# Patient Record
Sex: FEMALE | Race: AMERICAN INDIAN OR ALASKA NATIVE | NOT HISPANIC OR LATINO | Employment: UNEMPLOYED | URBAN - METROPOLITAN AREA
[De-identification: names, ages, dates, MRNs, and addresses within clinical notes are randomized per-mention and may not be internally consistent; named-entity substitution may affect disease eponyms.]

---
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
## 2023-07-11 RX ORDER — LEVONORGESTREL AND ETHINYL ESTRADIOL 0.1-0.02MG
1 KIT ORAL
Qty: 28 TABLET | Refills: 0 | Status: SHIPPED | OUTPATIENT
Start: 2023-07-11 | End: 2023-08-08 | Stop reason: SDUPTHER

## 2023-07-11 NOTE — TELEPHONE ENCOUNTER
Mom called for a refill for her daughters birth control. I stated I will submit to a refill request for 1 month and pt needs to schedule an annual annual was scheduled.

## 2023-08-08 ENCOUNTER — TELEPHONE (OUTPATIENT)
Dept: OBGYN | Facility: CLINIC | Age: 18
End: 2023-08-08
Payer: MEDICAID

## 2023-08-08 DIAGNOSIS — Z30.41 ENCOUNTER FOR SURVEILLANCE OF CONTRACEPTIVE PILLS: ICD-10-CM

## 2023-08-08 RX ORDER — LEVONORGESTREL AND ETHINYL ESTRADIOL 0.1-0.02MG
1 KIT ORAL
Qty: 28 TABLET | Refills: 0 | Status: SHIPPED | OUTPATIENT
Start: 2023-08-08 | End: 2023-08-22 | Stop reason: SDUPTHER

## 2023-08-08 NOTE — TELEPHONE ENCOUNTER
Pt called triage line stating she will be out of her Birth control pills tomorrow needs a refill called pt to notify her that per consult with Alisha Anaya P.A-C we are able to refill 1 month because we have not seen her since 2020 urge to keep annual appt on 8/22/2023 for additional refills unable to contact pt left message to call back.

## 2023-08-22 ENCOUNTER — APPOINTMENT (OUTPATIENT)
Dept: OBGYN | Facility: CLINIC | Age: 18
End: 2023-08-22
Payer: MEDICAID

## 2023-08-22 DIAGNOSIS — Z30.41 ENCOUNTER FOR SURVEILLANCE OF CONTRACEPTIVE PILLS: ICD-10-CM

## 2023-08-22 RX ORDER — LEVONORGESTREL AND ETHINYL ESTRADIOL 0.1-0.02MG
1 KIT ORAL
Qty: 28 TABLET | Refills: 0 | Status: CANCELLED | OUTPATIENT
Start: 2023-08-22

## 2023-08-22 NOTE — TELEPHONE ENCOUNTER
Returned pt's. Call. Pt stated she has an appointment today with Balaji Estrada for an annual/BC refill but would like to cancel today's appointment. Pt stated she prefers to see Dr. Abreu but it's very hard to get an appointment with him. Pt stated she needs a refill for her BC pills and was wondering if she can get a courtesy refill for a month or two while waiting for her appoitment to see Dr. Abreu. (I spoke with Raine Carpio who stated that the next available appointment with Dr. Abreu is not until 09/08/23) pt was informed and agreed to come that day 09/08/23.  A message will be sent to Dr. Abreu for BC refill request.

## 2023-08-24 RX ORDER — LEVONORGESTREL AND ETHINYL ESTRADIOL 0.1-0.02MG
1 KIT ORAL
Qty: 28 TABLET | Refills: 0 | Status: SHIPPED | OUTPATIENT
Start: 2023-08-24

## 2023-09-08 ENCOUNTER — GYNECOLOGY VISIT (OUTPATIENT)
Dept: OBGYN | Facility: CLINIC | Age: 18
End: 2023-09-08
Payer: MEDICAID

## 2023-09-08 VITALS — SYSTOLIC BLOOD PRESSURE: 139 MMHG | DIASTOLIC BLOOD PRESSURE: 62 MMHG | WEIGHT: 293 LBS

## 2023-09-08 DIAGNOSIS — R63.5 WEIGHT GAIN: ICD-10-CM

## 2023-09-08 DIAGNOSIS — N94.4 PRIMARY DYSMENORRHEA: ICD-10-CM

## 2023-09-08 PROCEDURE — 3078F DIAST BP <80 MM HG: CPT | Performed by: OBSTETRICS & GYNECOLOGY

## 2023-09-08 PROCEDURE — 99203 OFFICE O/P NEW LOW 30 MIN: CPT | Performed by: OBSTETRICS & GYNECOLOGY

## 2023-09-08 PROCEDURE — 3075F SYST BP GE 130 - 139MM HG: CPT | Performed by: OBSTETRICS & GYNECOLOGY

## 2023-09-08 RX ORDER — DESOGESTREL AND ETHINYL ESTRADIOL 0.15-0.03
1 KIT ORAL DAILY
Qty: 84 TABLET | Refills: 3 | Status: SHIPPED | OUTPATIENT
Start: 2023-09-08

## 2023-09-08 ASSESSMENT — FIBROSIS 4 INDEX: FIB4 SCORE: 0.19

## 2023-09-08 NOTE — PROGRESS NOTES
Chief complaint;    Jocelyn Ley is a 18 y.o.  who presents complaining of weight gain.  Patient also has a primary dysmenorrhea.  She states she lost her father  due to heart attack  Not currently sexually active  She is currently on  OCP due to primary dysmenorrhea      Review of systems; denies fever chills abdominal pain, denies chest pain shortness of breath or urinary symptoms  Past medical history-  Past Medical History:   Diagnosis Date    Bronchitis     Cold 11    Cyst, ovarian 2017    Possible abscess    Other specified symptom associated with female genital organs     Labial adhesions - surgically removed    Sinusitis     Urinary tract infection      Past surgical history-  Past Surgical History:   Procedure Laterality Date    PELVIC EXAM UNDER ANESTHESIA  2013    Performed by Pascual Mota M.D. at [facility]    LYSIS ADHESIONS GYN  2013    Performed by Pascual Mota M.D. at [facility]    OTHER  2010    adenoidectomy    ADENOIDECTOMY      ADENOIDECTOMY       Allergies-Patient has no known allergies.  Medications-  Current Outpatient Medications on File Prior to Visit   Medication Sig Dispense Refill    levonorgestrel-ethinyl estradiol (SRONYX) 0.1-20 MG-MCG per tablet Take 1 Tablet by mouth every day. 28 Tablet 0    ibuprofen (MOTRIN) 200 MG Tab Take 400 mg by mouth every 6 hours as needed.      Levothyroxine Sodium 75 MCG Cap Take  by mouth.      metronidazole (FLAGYL) 500 MG Tab Take 1 Tab by mouth every 12 hours. (Patient not taking: Reported on 2020) 14 Tab 0     No current facility-administered medications on file prior to visit.     Social history-  Social History     Socioeconomic History    Marital status: Single     Spouse name: Not on file    Number of children: Not on file    Years of education: Not on file    Highest education level: Not on file   Occupational History    Not on file   Tobacco Use     Smoking status: Never    Smokeless tobacco: Never   Substance and Sexual Activity    Alcohol use: No    Drug use: No    Sexual activity: Never     Birth control/protection: OCP     Comment: OCP's for irregular cycles   Other Topics Concern    Behavioral problems Not Asked    Interpersonal relationships Not Asked    Sad or not enjoying activities Not Asked    Suicidal thoughts Not Asked    Poor school performance Not Asked    Reading difficulties Not Asked    Speech difficulties Not Asked    Writing difficulties Not Asked    Inadequate sleep Not Asked    Excessive TV viewing Not Asked    Excessive video game use Not Asked    Inadequate exercise Not Asked    Sports related Not Asked    Poor diet Not Asked    Second-hand smoke exposure Not Asked    Family concerns for drug/alcohol abuse Not Asked    Violence concerns Not Asked    Poor oral hygiene Not Asked    Bike safety Not Asked    Family concerns vehicle safety Not Asked   Social History Narrative    Not on file     Social Determinants of Health     Financial Resource Strain: Not on file   Food Insecurity: Not on file   Transportation Needs: Not on file   Physical Activity: Not on file   Stress: Not on file   Social Connections: Not on file   Intimate Partner Violence: Not on file   Housing Stability: Not on file     Past Family History-no history of breast or ovarian cancer    Physical examination;  Alert and oriented x3  General a thin well-developed well-nourished female in no apparent distress  Vitals:    09/08/23 0902   BP: 139/62   BP Location: Right arm   Patient Position: Sitting   BP Cuff Size: Small adult   Weight: (!) 386 lb     Skin is warm and dry  Neck-supple  HEENT-head-atraumatic, normocephalic, EOMI, PERRLA  Cardiovascular-regular rate and rhythm, normal S1-S2, no murmurs or gallops  Lungs-clear to auscultation bilaterally  Back-negative CVA tenderness  Abdomen-nondistended positive bowel sounds soft nontender no masses or  hepatosplenomegaly    Extremities without cyanosis clubbing or edema  Neurologic exam grossly intact    Impression;  Primary dysmenorrhea  Weight gain    Plan;  Patient is not sexually active and not due for Pap smear until age 19 so gynecologic exam not warranted at the present time.  Patient does have excessive weight gain and recommend thorough evaluation to include thyroid disorder, diabetes mellitus or other metabolic syndrome.  Patient will likely need work-up with dietitian to include food diary etc.  Patient needs to see a primary care provider who specializes in weight loss.  Would also recommend behavioral therapy due to history of loss of her father with probable adjustment disorder.  Recommend continuous nonsteroidals through her menstrual cycle to to be used as an antiprostaglandin-she may use ibuprofen or Naprosyn  We will change pill to 1/30 pill        Female chaperone present for entire examination and history

## 2024-07-30 RX ORDER — DESOGESTREL AND ETHINYL ESTRADIOL 0.15-0.03
1 KIT ORAL
Qty: 84 TABLET | Refills: 3 | Status: SHIPPED | OUTPATIENT
Start: 2024-07-30

## 2025-08-07 ENCOUNTER — TELEPHONE (OUTPATIENT)
Dept: OBGYN | Facility: MEDICAL CENTER | Age: 20
End: 2025-08-07
Payer: MEDICAID